# Patient Record
(demographics unavailable — no encounter records)

---

## 2025-02-03 NOTE — HISTORY OF PRESENT ILLNESS
[FreeTextEntry1] : BRADEN VINCENT  is a 64 year old  F Today she returns the office for annual clinical follow-up.  Today's EKG has normal sinus rhythm with an incomplete right bundle branch block this EKG is unchanged from prior last year and echocardiogram and stress test were performed Prior blood work January 2025 hemoglobin 14.0 potassium 3.7 AST 45 ALT 66 creatinine 0.6 total cholesterol 210  TSH 1.6 prior blood work July 2024 creatinine 0.8 AST 54 ALT 71 hemoglobin 14.5 hyperlipidemia.  Elevated LFTs.  I believe the clinical benefit of lipid-lowering therapy outweighs risk.  Follow-up blood work.  Trial of lifestyle measures. Risk enhancers included. If cholesterol remains elevated reviewed indication for lipid-lowering therapy Reviewed clinical red flags. There are less flutters in her chest.  Her mother had cardiovascular disease.  She is careful about her diet as her and her partner have had a longstanding meat allergy.  But she reports she can do better with exercise.  I discussed coronary artery calcium scoring for further restratification.  Also would request risk enhancers with future blood work  referred by primary physician Dr. 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 no longer smoking.  Echocardiogram has normal left ventricular function mild tricuspid regurgitation.  Stress test exercised 10 minutes peak heart rate 151 technically difficult image quality but no regional wall motion abnormalities  monitor sinus rhythm. No dysrhythmia. Isolated PVC. Reubens less than 1%.  At times her symptoms correlated with PVCs.  Blood work reviewed. January 2024 total cholesterol 217  vitamin B12 less than assay TSH 1.6 creatinine 0.7 A1c 5.5 LP(a) 122.  Mild valvular heart disease. Normal left ventricular function. No significant ischemic heart disease. Benign low burden of ectopy.  hyperlipidemia with elevated LFTs longstanding heart murmur mild vhd significant family history of cardiovascular disease Likely benefit from lipid-lowering therapy. She is very reluctant to take medication.

## 2025-02-03 NOTE — HISTORY OF PRESENT ILLNESS
[FreeTextEntry1] : BRADEN VINCENT  is a 64 year old  F Today she returns the office for annual clinical follow-up.  Today's EKG has normal sinus rhythm with an incomplete right bundle branch block this EKG is unchanged from prior last year and echocardiogram and stress test were performed Prior blood work January 2025 hemoglobin 14.0 potassium 3.7 AST 45 ALT 66 creatinine 0.6 total cholesterol 210  TSH 1.6 prior blood work July 2024 creatinine 0.8 AST 54 ALT 71 hemoglobin 14.5 hyperlipidemia.  Elevated LFTs.  I believe the clinical benefit of lipid-lowering therapy outweighs risk.  Follow-up blood work.  Trial of lifestyle measures. Risk enhancers included. If cholesterol remains elevated reviewed indication for lipid-lowering therapy Reviewed clinical red flags. There are less flutters in her chest.  Her mother had cardiovascular disease.  She is careful about her diet as her and her partner have had a longstanding meat allergy.  But she reports she can do better with exercise.  I discussed coronary artery calcium scoring for further restratification.  Also would request risk enhancers with future blood work  referred by primary physician Dr. 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 no longer smoking.  Echocardiogram has normal left ventricular function mild tricuspid regurgitation.  Stress test exercised 10 minutes peak heart rate 151 technically difficult image quality but no regional wall motion abnormalities  monitor sinus rhythm. No dysrhythmia. Isolated PVC. Irons less than 1%.  At times her symptoms correlated with PVCs.  Blood work reviewed. January 2024 total cholesterol 217  vitamin B12 less than assay TSH 1.6 creatinine 0.7 A1c 5.5 LP(a) 122.  Mild valvular heart disease. Normal left ventricular function. No significant ischemic heart disease. Benign low burden of ectopy.  hyperlipidemia with elevated LFTs longstanding heart murmur mild vhd significant family history of cardiovascular disease Likely benefit from lipid-lowering therapy. She is very reluctant to take medication.